# Patient Record
Sex: FEMALE | Race: WHITE
[De-identification: names, ages, dates, MRNs, and addresses within clinical notes are randomized per-mention and may not be internally consistent; named-entity substitution may affect disease eponyms.]

---

## 2021-03-11 ENCOUNTER — HOSPITAL ENCOUNTER (OUTPATIENT)
Dept: HOSPITAL 7 - FB.SDS | Age: 51
Discharge: HOME | End: 2021-03-11
Attending: SURGERY
Payer: COMMERCIAL

## 2021-03-11 DIAGNOSIS — Z98.890: ICD-10-CM

## 2021-03-11 DIAGNOSIS — D12.3: ICD-10-CM

## 2021-03-11 DIAGNOSIS — I10: ICD-10-CM

## 2021-03-11 DIAGNOSIS — Z79.899: ICD-10-CM

## 2021-03-11 DIAGNOSIS — E66.01: ICD-10-CM

## 2021-03-11 DIAGNOSIS — Z88.6: ICD-10-CM

## 2021-03-11 DIAGNOSIS — Z80.0: ICD-10-CM

## 2021-03-11 DIAGNOSIS — Z12.11: Primary | ICD-10-CM

## 2021-03-11 NOTE — PCM.OPNOTE
- General Post-Op/Procedure Note


Date of Surgery/Procedure: 03/11/21


Operative Procedure(s): c scope with cold forcep biopsy


Findings: 





transverse colon polyp 


Pre Op Diagnosis: family hx of colon ca


Post-Op Diagnosis: transverse colon polyp


Anesthesia Technique: MAC


Primary Surgeon: Ghassan Strauss


Anesthesia Provider: Chase Storm


Pathology: 





colon polyp 


Complications: None


Condition: Good


Free Text/Narrative:: 


#[id number] see dictation

## 2023-03-16 NOTE — OR
DATE OF OPERATION:  03/11/2021

 

SURGEON:  Ghassan Strauss MD

 

PROCEDURE PERFORMED:  Colonoscopy with cold forceps biopsy.

 

PREOPERATIVE DIAGNOSIS:  Family history of colon cancer.

 

POSTOPERATIVE DIAGNOSIS:  Transverse colon polyp.

 

INDICATIONS FOR PROCEDURE:  This is a 50-year-old white female referred for a

screening colonoscopy.  Family history is significant for a relative with cancer

of the colon.  She is currently without symptoms.  She was offered and accepted

C-scope.

 

DESCRIPTION OF OPERATION:  After an excellent IV sedation was administered,

digital rectal exam was performed.  No marked abnormality was noted.  Flexible

colonoscope was inserted and advanced through to the cecum.  The prep was

excellent. The cecum was identified by usual anatomic markers. The following 
findings were noted.  Ascending colon, unremarkable.

Transverse colon, mid transverse colon 4 mm polyp, biopsied and completely

obliterated with cold biopsy forceps.  The remainder of the transverse colon was

unremarkable.  The descending colon was unremarkable.  The sigmoid and rectum

were unremarkable.  The patient tolerated the procedure well, was taken to

recovery room in good condition.  Results will be sent to the patient via

letter.

 

Job#: [id number]

DD: 03/11/2021 0823

DT: 03/11/2021 0932 AS/YUMIKO HOGAN
Alert and oriented to person, place and time